# Patient Record
Sex: MALE | Race: WHITE | NOT HISPANIC OR LATINO | Employment: FULL TIME | URBAN - METROPOLITAN AREA
[De-identification: names, ages, dates, MRNs, and addresses within clinical notes are randomized per-mention and may not be internally consistent; named-entity substitution may affect disease eponyms.]

---

## 2018-05-16 ENCOUNTER — HOSPITAL ENCOUNTER (EMERGENCY)
Facility: HOSPITAL | Age: 38
Discharge: HOME | End: 2018-05-16
Attending: EMERGENCY MEDICINE
Payer: OTHER GOVERNMENT

## 2018-05-16 VITALS
DIASTOLIC BLOOD PRESSURE: 87 MMHG | HEIGHT: 70 IN | RESPIRATION RATE: 16 BRPM | OXYGEN SATURATION: 96 % | TEMPERATURE: 99 F | WEIGHT: 160 LBS | HEART RATE: 72 BPM | BODY MASS INDEX: 22.9 KG/M2 | SYSTOLIC BLOOD PRESSURE: 140 MMHG

## 2018-05-16 DIAGNOSIS — R07.81 RIB PAIN ON RIGHT SIDE: Primary | ICD-10-CM
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
## 2018-05-16 PROCEDURE — 25000003 PHARM REV CODE 250: Performed by: EMERGENCY MEDICINE

## 2018-05-16 PROCEDURE — 99284 EMERGENCY DEPT VISIT MOD MDM: CPT | Mod: 25

## 2018-05-16 PROCEDURE — 25000003 PHARM REV CODE 250: Performed by: NURSE PRACTITIONER

## 2018-05-16 RX ORDER — MELOXICAM 7.5 MG/1
7.5 TABLET ORAL DAILY
Qty: 20 TABLET | Refills: 0 | Status: SHIPPED | OUTPATIENT
Start: 2018-05-16

## 2018-05-16 RX ORDER — HYDROCODONE BITARTRATE AND ACETAMINOPHEN 5; 325 MG/1; MG/1
1 TABLET ORAL EVERY 4 HOURS PRN
Qty: 10 TABLET | Refills: 0 | Status: SHIPPED | OUTPATIENT
Start: 2018-05-16

## 2018-05-16 RX ORDER — LISINOPRIL 20 MG/1
20 TABLET ORAL DAILY
COMMUNITY

## 2018-05-16 RX ORDER — NAPROXEN 500 MG/1
500 TABLET ORAL
Status: COMPLETED | OUTPATIENT
Start: 2018-05-16 | End: 2018-05-16

## 2018-05-16 RX ORDER — METHOCARBAMOL 500 MG/1
1000 TABLET, FILM COATED ORAL
Status: COMPLETED | OUTPATIENT
Start: 2018-05-16 | End: 2018-05-16

## 2018-05-16 RX ORDER — HYDROCODONE BITARTRATE AND ACETAMINOPHEN 5; 325 MG/1; MG/1
1 TABLET ORAL
Status: DISCONTINUED | OUTPATIENT
Start: 2018-05-16 | End: 2018-05-17 | Stop reason: HOSPADM

## 2018-05-16 RX ORDER — METHOCARBAMOL 500 MG/1
1000 TABLET, FILM COATED ORAL 3 TIMES DAILY
Qty: 30 TABLET | Refills: 0 | Status: SHIPPED | OUTPATIENT
Start: 2018-05-16 | End: 2018-05-21

## 2018-05-16 RX ORDER — SIMVASTATIN 20 MG/1
20 TABLET, FILM COATED ORAL NIGHTLY
COMMUNITY

## 2018-05-16 RX ADMIN — NAPROXEN 500 MG: 500 TABLET ORAL at 09:05

## 2018-05-16 RX ADMIN — METHOCARBAMOL 1000 MG: 500 TABLET ORAL at 09:05

## 2018-05-17 NOTE — ED PROVIDER NOTES
"Encounter Date: 5/16/2018    This is a SORT/MSE of a 37 y.o. male presenting to the ED with c/o left rib pain following a fall on it 1 week ago. Care will be transferred to an alternate provider when patient is roomed for a full evaluation and final disposition. ASHWIN Daley, FNP-C    SCRIBE #1 NOTE: Mer ROLLE, am scribing for, and in the presence of, Hyun North MD.       History     Chief Complaint   Patient presents with    Rib Pain     reports falling and landing on rib playing flag football x1 week ago. reports pain and "popping" in chest now with activity.      CC: Rib Pain    HPI: This 37 y.o male presents to the ED for an evaluation of acute, constant, 7/10 left sided rib pain that began 1 week ago.  Patient reports the pain began after falling onto his left side while playing flag football.  Patient reports his pain is worse with movement.  Patient reports coming to the ED today after he began hearing "popping" to his left ribs while bending to lift objects from the floor.  Patient reports he has been attempting treatment for his pain with Motrin.  Patient denies fever, chills, nausea, emesis, diarrhea, abdominal pain, back pain, shortness of breath, or any other associated symptoms.        The history is provided by the patient. No  was used.     Review of patient's allergies indicates:  No Known Allergies  No past medical history on file.  No past surgical history on file.  No family history on file.  Social History   Substance Use Topics    Smoking status: Former Smoker    Smokeless tobacco: Not on file    Alcohol use No     Review of Systems   Constitutional: Negative for chills and fever.   HENT: Negative for ear pain and sore throat.    Eyes: Negative for pain.   Respiratory: Negative for cough and shortness of breath.    Cardiovascular: Negative for chest pain.   Gastrointestinal: Negative for abdominal pain, diarrhea, nausea and vomiting.   Genitourinary: " "Negative for dysuria.   Musculoskeletal: Negative for back pain.        (+) left sided rib pain   Skin: Negative for rash.   Neurological: Negative for headaches.       Physical Exam     Initial Vitals   BP Pulse Resp Temp SpO2   -- -- -- -- --      MAP       --         Physical Exam    Nursing note and vitals reviewed.  Constitutional: Vital signs are normal. He appears well-developed and well-nourished. He is active.  Non-toxic appearance. No distress.   HENT:   Head: Normocephalic and atraumatic.   Eyes: EOM are normal.   Neck: Trachea normal. Neck supple.   Cardiovascular: Normal rate, regular rhythm and normal heart sounds.   Pulmonary/Chest: Breath sounds normal. No respiratory distress. He has no decreased breath sounds. He has no wheezes. He has no rhonchi. He has no rales. He exhibits tenderness.   Patient has tenderness to palpation to the left rib spaces at T4 to the lower ribs.  Increased pain to the intercostal muscles.    Increased chest wall pain with resisted R shoulder extension.    Abdominal: Soft. Normal appearance and bowel sounds are normal. He exhibits no distension. There is no tenderness.   Musculoskeletal: Normal range of motion. He exhibits no edema.   Neurological: He is alert.   Skin: Skin is warm, dry and intact.   Psychiatric: He has a normal mood and affect.         ED Course   Procedures  Labs Reviewed - No data to display       X-Rays:   Independently Interpreted Readings:   Other Readings:  R rib xray negative for fracture.     Medical Decision Making:   History:   Old Medical Records: I decided to obtain old medical records.  Initial Assessment:   37 y.o male presents to the ED for an evaluation of acute, constant, 7/10 left sided rib pain that began 1 week ago.  Patient reports the pain began after falling onto his left side while playing flag football.  Today noticed a "popping" with bending down.  Differential Diagnosis:   Muscle sprain, Fracture, Contusion  Clinical Tests: "   Radiological Study: Ordered and Reviewed  ED Management:  Will treat patient's pain in the ED.  Chest xray pending. Will reassess. Disposition pending reassessment.  Anticipate discharge home with symptomatic treatment and outpatient follow-up.          Update note: Pain is improve with robaxin. Xray negative for fracture. Symptoms likely due to contusion and chest wall strain. Will discharge with symptomatic treatment with OP f/u.    10:48 PM 5/16/2018  Héctor North MD                Scribe Attestation:   Scribe #1: I performed the above scribed service and the documentation accurately describes the services I performed. I attest to the accuracy of the note.    Attending Attestation:           Physician Attestation for Scribe:  Physician Attestation Statement for Scribe #1: I, Héctor North MD, reviewed documentation, as scribed by Mer Nj in my presence, and it is both accurate and complete.                    Clinical Impression:   The encounter diagnosis was Fall.                           Héctor North MD  05/16/18 [id number]       Héctor North MD  05/16/18 [id number]

## 2018-05-17 NOTE — ED TRIAGE NOTES
Pt. present to ER with complaints of chest pain and rib pain after falling on Thursday. Pt. States coughing, sneezing or doing strenuous activity worsen the pain. Pt. States pain is relive upon rest. Pt. denies any headache, dizziness.

## 2019-09-06 ENCOUNTER — APPOINTMENT (RX ONLY)
Dept: URBAN - METROPOLITAN AREA OTHER 12 | Facility: OTHER | Age: 39
Setting detail: DERMATOLOGY
End: 2019-09-06

## 2019-09-06 DIAGNOSIS — L57.0 ACTINIC KERATOSIS: ICD-10-CM

## 2019-09-06 DIAGNOSIS — Z85.828 PERSONAL HISTORY OF OTHER MALIGNANT NEOPLASM OF SKIN: ICD-10-CM

## 2019-09-06 PROCEDURE — ? LIQUID NITROGEN

## 2019-09-06 PROCEDURE — ? COUNSELING

## 2019-09-06 PROCEDURE — 99241: CPT | Mod: 25

## 2019-09-06 PROCEDURE — ? OTHER

## 2019-09-06 PROCEDURE — 17000 DESTRUCT PREMALG LESION: CPT

## 2019-09-06 ASSESSMENT — LOCATION SIMPLE DESCRIPTION DERM
LOCATION SIMPLE: LEFT CHEEK
LOCATION SIMPLE: POSTERIOR NECK

## 2019-09-06 ASSESSMENT — LOCATION DETAILED DESCRIPTION DERM
LOCATION DETAILED: LEFT CENTRAL MALAR CHEEK
LOCATION DETAILED: LEFT LATERAL TRAPEZIAL NECK

## 2019-09-06 ASSESSMENT — LOCATION ZONE DERM
LOCATION ZONE: FACE
LOCATION ZONE: NECK

## 2019-09-06 NOTE — PROCEDURE: OTHER
Detail Level: Simple
Note Text (......Xxx Chief Complaint.): This diagnosis correlates with the
Other (Free Text): Return to clinic in 1 month if not resolved.

## 2019-09-06 NOTE — HPI: HISTORY OF BASAL CELL CARCINOMA
What Is The Reason For Today's Visit?: Surveillance Against Skin Cancer Recurrences
How Many Bccs Have You Had?: one
When Was Your Last Cancer Diagnosed?: 2013/14